# Patient Record
Sex: FEMALE | Race: WHITE | URBAN - METROPOLITAN AREA
[De-identification: names, ages, dates, MRNs, and addresses within clinical notes are randomized per-mention and may not be internally consistent; named-entity substitution may affect disease eponyms.]

---

## 2022-10-27 ENCOUNTER — OFFICE VISIT (OUTPATIENT)
Dept: ORTHOPEDIC SURGERY | Age: 57
End: 2022-10-27
Payer: OTHER GOVERNMENT

## 2022-10-27 VITALS — BODY MASS INDEX: 39.56 KG/M2 | HEIGHT: 62 IN | WEIGHT: 215 LBS | TEMPERATURE: 97.1 F

## 2022-10-27 DIAGNOSIS — M65.4 DE QUERVAIN'S TENOSYNOVITIS, RIGHT: ICD-10-CM

## 2022-10-27 DIAGNOSIS — M18.11 ARTHRITIS OF CARPOMETACARPAL (CMC) JOINT OF RIGHT THUMB: Primary | ICD-10-CM

## 2022-10-27 PROCEDURE — 99203 OFFICE O/P NEW LOW 30 MIN: CPT | Performed by: ORTHOPAEDIC SURGERY

## 2022-10-27 PROCEDURE — 20600 DRAIN/INJ JOINT/BURSA W/O US: CPT | Performed by: ORTHOPAEDIC SURGERY

## 2022-10-27 RX ORDER — EMPAGLIFLOZIN 10 MG/1
TABLET, FILM COATED ORAL
COMMUNITY
Start: 2022-08-24

## 2022-10-27 RX ORDER — LOSARTAN POTASSIUM AND HYDROCHLOROTHIAZIDE 25; 100 MG/1; MG/1
TABLET ORAL
COMMUNITY
Start: 2022-10-09

## 2022-10-27 RX ORDER — BUPROPION HYDROCHLORIDE 150 MG/1
150 TABLET, EXTENDED RELEASE ORAL ONCE
COMMUNITY

## 2022-10-27 RX ORDER — METFORMIN HYDROCHLORIDE 500 MG/1
TABLET, EXTENDED RELEASE ORAL
COMMUNITY
Start: 2022-08-12

## 2022-10-27 NOTE — PROGRESS NOTES
John Brian is a 61 y.o. female  . Worker's Compensation and legal considerations: none filed. Vitals:    10/27/22 1305   Temp: 97.1 °F (36.2 °C)   TempSrc: Temporal   Weight: 215 lb (97.5 kg)   Height: 5' 2\" (1.575 m)   PainSc:   4   PainLoc: Wrist           Chief Complaint   Patient presents with    Wrist Pain     Right wrist pain         HPI: Patient presents today with complaints of right wrist pain. She localizes it to the base of the thumb with some radiation up the arm. Date of onset: June 2022    Injury: No    Prior Treatment:  No    Numbness/ Tingling: No      ROS: Review of Systems - General ROS: negative  Psychological ROS: negative  ENT ROS: negative  Allergy and Immunology ROS: negative  Hematological and Lymphatic ROS: negative  Respiratory ROS: no cough, shortness of breath, or wheezing  Cardiovascular ROS: no chest pain or dyspnea on exertion  Gastrointestinal ROS: no abdominal pain, change in bowel habits, or black or bloody stools  Musculoskeletal ROS: negative  Neurological ROS: negative  Dermatological ROS: negative    No past medical history on file. No past surgical history on file. Current Outpatient Medications   Medication Sig Dispense Refill    metFORMIN ER (GLUCOPHAGE XR) 500 mg tablet       losartan-hydroCHLOROthiazide (HYZAAR) 100-25 mg per tablet       Jardiance 10 mg tablet       buPROPion SR (WELLBUTRIN SR) 150 mg SR tablet Take 150 mg by mouth once. Allergies   Allergen Reactions    Augmentin [Amoxicillin-Pot Clavulanate] Unknown (comments)    Bactrim [Sulfamethoprim] Unknown (comments)    Erythromycin Unknown (comments)           PE:     Physical Exam  Vitals and nursing note reviewed. Constitutional:       General: She is not in acute distress. Appearance: Normal appearance. She is not ill-appearing. Cardiovascular:      Pulses: Normal pulses. Pulmonary:      Effort: Pulmonary effort is normal. No respiratory distress.    Musculoskeletal: General: Swelling and tenderness present. No deformity or signs of injury. Normal range of motion. Cervical back: Normal range of motion and neck supple. Right lower leg: No edema. Left lower leg: No edema. Skin:     General: Skin is warm and dry. Capillary Refill: Capillary refill takes less than 2 seconds. Findings: No bruising or erythema. Neurological:      General: No focal deficit present. Mental Status: She is alert and oriented to person, place, and time. Psychiatric:         Mood and Affect: Mood normal.         Behavior: Behavior normal.          Hand:    Examination L Digit(s) R Digit(s)   1st CMC Tenderness -  +    1st CMC Grind -  +    Brandon Nodes -  -    Heberden Nodes -  -    A1 Pulley Tenderness -  -    Triggering -  -    UCL Instability -  -    RCL Instability -  -    Lateral Stress Pain -  -    Palmar Cords -  -    Tabletop test -  -    Garrod's Pads -  -     Strength       Pinch Strength         ROM: Full      Wrist:    Tenderness L R Test L R   1st Ext Comp - +- Finkelstein's - +-   Snuff Box - - Godinez - -   2nd Ext Comp - - S-L Shear - -   S-L Joint - - L-T Shear - -   L-T Joint - - DRUJ Sup - -   6th Ext Comp - - DRUJ Pro - -   Ulnar Snuff - - DRUJ Grind - -   Fovea - - TFCC - -   STT Joint - - Mid-Carp Inst - -   FCR - - P-T Grind - -   Intersection - - ECU Sublux. - -      Dorsal Ganglion: -   Volar Ganglion: -      ROM: Full        Imaging:     Deferred today        ICD-10-CM ICD-9-CM    1. Arthritis of carpometacarpal (CMC) joint of right thumb  M18.11 716.94 AMB SUPPLY ORDER      triamcinolone acetonide (KENALOG) 10 mg/mL injection 5 mg      MA DRAIN/INJECT SMALL JOINT/BURSA      2. De Quervain's tenosynovitis, right  M65.4 727.04             Plan:     Right thumb CMC joint injection and CMC brace.     There may be some component of de Quervain's tenosynovitis however it appears that most of the pain is coming from her [facility] joint.    Follow-up and Dispositions    Return in about 2 months (around 12/27/2022) for Reevaluation and x-rays on arrival of right wrist.          Plan was reviewed with patient, who verbalized agreement and understanding of the plan    [facility] NOTE        Chart reviewed for the following:   Oswaldo DAVIS DO, have reviewed the History, Physical and updated the Allergic reactions for [address] performed immediately prior to start of procedure:   Oswaldo DAVIS DO, have performed the following reviews on Ciara Urias prior to the start of the procedure:            * Patient was identified by name and date of birth   * Agreement on procedure being performed was verified  * Risks and Benefits explained to the patient  * Procedure site verified and marked as necessary  * Patient was positioned for comfort  * Consent was signed and verified     Time: 13:15      Date of procedure: 10/27/2022    Procedure performed by: Elayne Lacy DO    Provider assisted by: Aung Aceves MA    Patient assisted by: self    How tolerated by patient: tolerated the procedure well with no complications    Post Procedural Pain Scale: 0 - No Hurt    Comments: none    Procedure:  After consent was obtained, using sterile technique the right thumb was prepped. Local anesthetic used: 1% lidocaine. Kenalog 5 mg and was then injected and the needle withdrawn. The procedure was well tolerated. The patient is asked to continue to rest the area for a few more days before resuming regular activities. It may be more painful for the first 1-2 days. Watch for fever, or increased swelling or persistent pain in the joint. Call or return to clinic prn if such symptoms occur or there is failure to improve as anticipated.

## 2022-10-27 NOTE — LETTER
10/27/2022    Patient: Marino Blank   YOB: 1965   Date of Visit: 10/27/2022     Fuentes Shearer MD  [place] [address]  [address] [facility]  Via Fax: [phone]    Dear Fuentes Shearer MD,      Thank you for referring Ms. Marino Blank to [address] [place] [address] for evaluation. My notes for this consultation are attached. If you have questions, please do not hesitate to call me. I look forward to following your patient along with you.       Sincerely,    Gargisel Mercer, DO

## 2023-04-26 ENCOUNTER — OFFICE VISIT (OUTPATIENT)
Age: 58
End: 2023-04-26

## 2023-04-26 VITALS — BODY MASS INDEX: 38.64 KG/M2 | HEIGHT: 62 IN | WEIGHT: 210 LBS

## 2023-04-26 DIAGNOSIS — M18.11 ARTHRITIS OF CARPOMETACARPAL (CMC) JOINT OF RIGHT THUMB: Primary | ICD-10-CM

## 2023-04-26 RX ORDER — CETIRIZINE HYDROCHLORIDE 10 MG/1
10 CAPSULE, LIQUID FILLED ORAL
COMMUNITY
Start: 2015-02-11

## 2023-04-26 RX ORDER — TRAZODONE HYDROCHLORIDE 50 MG/1
TABLET ORAL
COMMUNITY
Start: 2023-02-19

## 2023-04-26 RX ORDER — CYCLOBENZAPRINE HCL 10 MG
TABLET ORAL
COMMUNITY
Start: 2023-01-17

## 2023-04-26 RX ORDER — LIDOCAINE 50 MG/G
PATCH TOPICAL
COMMUNITY
Start: 2023-01-17

## 2023-04-26 RX ORDER — HYDROCHLOROTHIAZIDE 12.5 MG/1
12.5 CAPSULE, GELATIN COATED ORAL DAILY
COMMUNITY

## 2023-04-26 RX ORDER — DICLOFENAC SODIUM 75 MG/1
TABLET, DELAYED RELEASE ORAL
COMMUNITY
Start: 2023-04-11

## 2023-04-26 RX ORDER — ATORVASTATIN CALCIUM 10 MG/1
TABLET, FILM COATED ORAL
COMMUNITY
Start: 2023-01-26

## 2023-04-26 RX ORDER — NIFEDIPINE 30 MG/1
TABLET, FILM COATED, EXTENDED RELEASE ORAL
COMMUNITY
Start: 2023-04-18

## 2023-04-26 RX ORDER — SEMAGLUTIDE 1.34 MG/ML
INJECTION, SOLUTION SUBCUTANEOUS
COMMUNITY
Start: 2023-03-22

## 2023-04-26 RX ORDER — OMEPRAZOLE 20 MG/1
20 CAPSULE, DELAYED RELEASE ORAL DAILY
COMMUNITY

## 2023-04-26 NOTE — PROGRESS NOTES
Orlin Oliva is a 60 y.o. female  . Worker's Compensation and legal considerations: none    Chief Complaint   Patient presents with    Hand Pain     Right     Pain Score:   8    HPI: Patient presents today for follow-up due to recurrence of right thumb base pain. Initial HPI: Patient presents today with complaints of right wrist pain. She localizes it to the base of the thumb with some radiation up the arm. Date of onset: June 2022  Injury: No  Prior Treatment:  Yes: Comment: Right thumb CMC joint injection and brace    ROS: Review of Systems - General ROS: negative except HPI    History reviewed. No pertinent past medical history. History reviewed. No pertinent surgical history.      Current Outpatient Medications   Medication Sig Dispense Refill    atorvastatin (LIPITOR) 10 MG tablet       Cetirizine HCl (ZYRTEC ALLERGY) 10 MG CAPS 10 mg      cyclobenzaprine (FLEXERIL) 10 MG tablet       diclofenac (VOLTAREN) 75 MG EC tablet       hydroCHLOROthiazide (MICROZIDE) 12.5 MG capsule Take 1 capsule by mouth daily      lidocaine (LIDODERM) 5 %       NIFEdipine (ADALAT CC) 30 MG extended release tablet       omeprazole (PRILOSEC) 20 MG delayed release capsule Take 1 capsule by mouth daily      OZEMPIC, 0.25 OR 0.5 MG/DOSE, 2 MG/1.5ML SOPN       traZODone (DESYREL) 50 MG tablet       buPROPion (WELLBUTRIN SR) 150 MG extended release tablet Take 1 tablet by mouth once      empagliflozin (JARDIANCE) 10 MG tablet ceived the following from [facility]: Outside name: Jardiance 10 mg tablet      losartan-hydroCHLOROthiazide (HYZAAR) 100-25 MG per tablet ceived the following from [facility]: Outside name: losartan-hydroCHLOROthiazide (HYZAAR) 100-25 mg per tablet      metFORMIN (GLUCOPHAGE-XR) 500 MG extended release tablet ceived the following from [facility]: Outside name: metFORMIN ER (GLUCOPHAGE XR) 500 mg tablet       Current Facility-Administered Medications

## 2023-11-21 ENCOUNTER — TELEPHONE (OUTPATIENT)
Age: 58
End: 2023-11-21

## 2023-11-21 NOTE — TELEPHONE ENCOUNTER
Called and spoke with pt. About her referral being  for  appt.  With provider, and she stated that she is aware and they are supposed to be faxing us the updated referral.

## 2023-11-27 ENCOUNTER — OFFICE VISIT (OUTPATIENT)
Age: 58
End: 2023-11-27
Payer: OTHER GOVERNMENT

## 2023-11-27 VITALS — WEIGHT: 206 LBS | HEIGHT: 62 IN | BODY MASS INDEX: 37.91 KG/M2

## 2023-11-27 DIAGNOSIS — M18.11 ARTHRITIS OF CARPOMETACARPAL (CMC) JOINT OF RIGHT THUMB: Primary | ICD-10-CM

## 2023-11-27 PROCEDURE — 20600 DRAIN/INJ JOINT/BURSA W/O US: CPT | Performed by: ORTHOPAEDIC SURGERY

## 2023-11-27 NOTE — PROGRESS NOTES
The patient is asked to continue to rest the area for a few more days before resuming regular activities. It may be more painful for the first 1-2 days. Watch for fever, or increased swelling or persistent pain in the joint. Call or return to clinic prn if such symptoms occur or there is failure to improve as anticipated. Note: This note was completed using voice recognition software.   Any typographical/name errors or mistakes are unintentional.

## 2023-12-05 ENCOUNTER — TELEPHONE (OUTPATIENT)
Age: 58
End: 2023-12-05

## 2023-12-05 NOTE — TELEPHONE ENCOUNTER
Spoke with patient  Patient stated:  Shot has helped in the past, but the pain did not subside after this last shot.  Patient states the pain feels like it is going to the thumb base and radiates to the distal aspect of the thumb. The Diclofenac didn't help either.    Patient states she doesn't want to wait as she normally does between shots, as this feels differently from before.

## 2023-12-05 NOTE — TELEPHONE ENCOUNTER
Patient is requesting a call back states that she have a few questions in regards to a new pain that is going on in the same area that she gets her injection in.      [phone]

## 2023-12-11 ENCOUNTER — OFFICE VISIT (OUTPATIENT)
Age: 58
End: 2023-12-11
Payer: OTHER GOVERNMENT

## 2023-12-11 VITALS — WEIGHT: 206 LBS | BODY MASS INDEX: 37.91 KG/M2 | HEIGHT: 62 IN

## 2023-12-11 DIAGNOSIS — M18.11 ARTHRITIS OF CARPOMETACARPAL (CMC) JOINT OF RIGHT THUMB: Primary | ICD-10-CM

## 2023-12-11 PROCEDURE — 99212 OFFICE O/P EST SF 10 MIN: CPT | Performed by: ORTHOPAEDIC SURGERY

## 2023-12-11 NOTE — PROGRESS NOTES
Bruce Aquino is a 60 y.o. female  . Worker's Compensation and legal considerations: none    Chief Complaint   Patient presents with    RT thumb     Pain Score:   5    HPI: Patient presents today due to incomplete relief of symptoms after her recent thumb CMC joint injection. 11/27/2023 HPI: Patient presents today for follow-up due to recurrence of right thumb base pain. Initial HPI: Patient presents today with complaints of right wrist pain. She localizes it to the base of the thumb with some radiation up the arm. Date of onset: June 2022  Injury: No  Prior Treatment:  Yes: Comment: Right thumb CMC joint injection and brace    ROS: Review of Systems - General ROS: negative except HPI    History reviewed. No pertinent past medical history.     Past Surgical History:   Procedure Laterality Date    US I&D BREAST ABSCESS DEEP  2/14/2019    US I&D BREAST ABSCESS DEEP        Current Outpatient Medications   Medication Sig Dispense Refill    atorvastatin (LIPITOR) 10 MG tablet       Cetirizine HCl (ZYRTEC ALLERGY) 10 MG CAPS 10 mg      cyclobenzaprine (FLEXERIL) 10 MG tablet       diclofenac (VOLTAREN) 75 MG EC tablet       hydroCHLOROthiazide (MICROZIDE) 12.5 MG capsule Take 1 capsule by mouth daily      lidocaine (LIDODERM) 5 %       NIFEdipine (ADALAT CC) 30 MG extended release tablet       omeprazole (PRILOSEC) 20 MG delayed release capsule Take 1 capsule by mouth daily      OZEMPIC, 0.25 OR 0.5 MG/DOSE, 2 MG/1.5ML SOPN       traZODone (DESYREL) 50 MG tablet       buPROPion (WELLBUTRIN SR) 150 MG extended release tablet Take 1 tablet by mouth once      empagliflozin (JARDIANCE) 10 MG tablet ceived the following from [facility]: Outside name: Jardiance 10 mg tablet      losartan-hydroCHLOROthiazide (HYZAAR) 100-25 MG per tablet ceived the following from [facility]: Outside name: losartan-hydroCHLOROthiazide (HYZAAR) 100-25 mg per tablet      metFORMIN (GLUCOPHAGE-XR) 500 MG

## 2024-03-28 ENCOUNTER — OFFICE VISIT (OUTPATIENT)
Age: 59
End: 2024-03-28

## 2024-03-28 VITALS — HEIGHT: 62 IN | BODY MASS INDEX: 37.73 KG/M2 | WEIGHT: 205 LBS

## 2024-03-28 DIAGNOSIS — M18.11 ARTHRITIS OF CARPOMETACARPAL (CMC) JOINT OF RIGHT THUMB: Primary | ICD-10-CM

## 2024-03-28 RX ORDER — LIDOCAINE HYDROCHLORIDE 10 MG/ML
0.5 INJECTION, SOLUTION INFILTRATION; PERINEURAL ONCE
Status: COMPLETED | OUTPATIENT
Start: 2024-03-28 | End: 2024-03-28

## 2024-03-28 RX ADMIN — LIDOCAINE HYDROCHLORIDE 0.5 ML: 10 INJECTION, SOLUTION INFILTRATION; PERINEURAL at 13:50

## 2024-08-08 ENCOUNTER — OFFICE VISIT (OUTPATIENT)
Age: 59
End: 2024-08-08

## 2024-08-08 VITALS — WEIGHT: 205 LBS | BODY MASS INDEX: 37.73 KG/M2 | HEIGHT: 62 IN

## 2024-08-08 DIAGNOSIS — M18.11 ARTHRITIS OF CARPOMETACARPAL (CMC) JOINT OF RIGHT THUMB: Primary | ICD-10-CM

## 2024-08-08 DIAGNOSIS — M25.561 CHRONIC PAIN OF RIGHT KNEE: ICD-10-CM

## 2024-08-08 DIAGNOSIS — G89.29 CHRONIC PAIN OF RIGHT KNEE: ICD-10-CM

## 2024-08-08 RX ORDER — LIDOCAINE HYDROCHLORIDE 10 MG/ML
0.5 INJECTION, SOLUTION INFILTRATION; PERINEURAL ONCE
Status: COMPLETED | OUTPATIENT
Start: 2024-08-08 | End: 2024-08-08

## 2024-08-08 RX ADMIN — LIDOCAINE HYDROCHLORIDE 0.5 ML: 10 INJECTION, SOLUTION INFILTRATION; PERINEURAL at 13:49

## 2024-08-08 NOTE — PROGRESS NOTES
Ilsa Castro is a 59 y.o. female  .  Worker's Compensation and legal considerations: none    Chief Complaint   Patient presents with    Hand Pain     Rt thumb     Pain Score:   7    8/8/2024 HPI: Patient returns today due to recurrence of right thumb base pain.  She is also complaining of right knee pain.    3/28/2024 HPI: Patient presents today for follow-up requesting a right thumb base injection.  Her initial injection resolved her symptoms significantly.  However her second injection did not do much for her symptoms.    Initial HPI: Patient presents today with complaints of right wrist pain.  She localizes it to the base of the thumb with some radiation up the arm.    Date of onset: June 2022  Injury: No  Prior Treatment:  Yes: Comment: Right thumb CMC joint injection and brace    ROS: Review of Systems - General ROS: negative except HPI    History reviewed. No pertinent past medical history.    Past Surgical History:   Procedure Laterality Date    US I&D BREAST ABSCESS DEEP  2/14/2019    US I&D BREAST ABSCESS DEEP        Current Outpatient Medications   Medication Sig Dispense Refill    atorvastatin (LIPITOR) 10 MG tablet       Cetirizine HCl (ZYRTEC ALLERGY) 10 MG CAPS 10 mg      cyclobenzaprine (FLEXERIL) 10 MG tablet       diclofenac (VOLTAREN) 75 MG EC tablet       hydroCHLOROthiazide (MICROZIDE) 12.5 MG capsule Take 1 capsule by mouth daily      lidocaine (LIDODERM) 5 %       NIFEdipine (ADALAT CC) 30 MG extended release tablet       omeprazole (PRILOSEC) 20 MG delayed release capsule Take 1 capsule by mouth daily      OZEMPIC, 0.25 OR 0.5 MG/DOSE, 2 MG/1.5ML SOPN       traZODone (DESYREL) 50 MG tablet       buPROPion (WELLBUTRIN SR) 150 MG extended release tablet Take 1 tablet by mouth once      empagliflozin (JARDIANCE) 10 MG tablet ceived the following from [facility]: Outside name: Jardiance 10 mg tablet      losartan-hydroCHLOROthiazide (HYZAAR) 100-25 MG per tablet ceived the

## 2024-12-05 ENCOUNTER — OFFICE VISIT (OUTPATIENT)
Age: 59
End: 2024-12-05

## 2024-12-05 VITALS — HEIGHT: 62 IN | WEIGHT: 207 LBS | BODY MASS INDEX: 38.09 KG/M2

## 2024-12-05 DIAGNOSIS — M18.11 ARTHRITIS OF CARPOMETACARPAL (CMC) JOINT OF RIGHT THUMB: Primary | ICD-10-CM

## 2024-12-05 RX ORDER — LIDOCAINE HYDROCHLORIDE 10 MG/ML
0.5 INJECTION, SOLUTION INFILTRATION; PERINEURAL ONCE
Status: COMPLETED | OUTPATIENT
Start: 2024-12-05 | End: 2024-12-05

## 2024-12-05 RX ADMIN — LIDOCAINE HYDROCHLORIDE 0.5 ML: 10 INJECTION, SOLUTION INFILTRATION; PERINEURAL at 13:59

## 2024-12-05 NOTE — PROGRESS NOTES
Ilsa Castro is a 59 y.o. female  .  Worker's Compensation and legal considerations: none    Chief Complaint   Patient presents with    Follow-up     Right thumb base pain        Pain Score:   7    12/6/2024 HPI: Patient presents today for follow-up of continued pain in the right thumb base.  She is requesting an injection today.    8/8/2024 HPI: Patient returns today due to recurrence of right thumb base pain.  She is also complaining of right knee pain.    Initial HPI: Patient presents today with complaints of right wrist pain.  She localizes it to the base of the thumb with some radiation up the arm.    Date of onset: June 2022  Injury: No  Prior Treatment:  Yes: Comment: Right thumb CMC joint injection and brace    ROS: Review of Systems - General ROS: negative except HPI    History reviewed. No pertinent past medical history.    Past Surgical History:   Procedure Laterality Date    US I&D BREAST ABSCESS DEEP  2/14/2019    US I&D BREAST ABSCESS DEEP        Current Outpatient Medications   Medication Sig Dispense Refill    atorvastatin (LIPITOR) 10 MG tablet       Cetirizine HCl (ZYRTEC ALLERGY) 10 MG CAPS 10 mg      cyclobenzaprine (FLEXERIL) 10 MG tablet       diclofenac (VOLTAREN) 75 MG EC tablet       hydroCHLOROthiazide (MICROZIDE) 12.5 MG capsule Take 1 capsule by mouth daily      lidocaine (LIDODERM) 5 %       NIFEdipine (ADALAT CC) 30 MG extended release tablet       omeprazole (PRILOSEC) 20 MG delayed release capsule Take 1 capsule by mouth daily      OZEMPIC, 0.25 OR 0.5 MG/DOSE, 2 MG/1.5ML SOPN       traZODone (DESYREL) 50 MG tablet       buPROPion (WELLBUTRIN SR) 150 MG extended release tablet Take 1 tablet by mouth once      empagliflozin (JARDIANCE) 10 MG tablet ceived the following from [facility]: Outside name: Jardiance 10 mg tablet      losartan-hydroCHLOROthiazide (HYZAAR) 100-25 MG per tablet ceived the following from [facility]: Outside name:

## 2025-03-13 ENCOUNTER — OFFICE VISIT (OUTPATIENT)
Age: 60
End: 2025-03-13

## 2025-03-13 VITALS
RESPIRATION RATE: 18 BRPM | BODY MASS INDEX: 38.57 KG/M2 | TEMPERATURE: 97.2 F | HEART RATE: 75 BPM | HEIGHT: 62 IN | WEIGHT: 209.6 LBS

## 2025-03-13 DIAGNOSIS — M18.11 ARTHRITIS OF CARPOMETACARPAL (CMC) JOINT OF RIGHT THUMB: Primary | ICD-10-CM

## 2025-03-13 RX ORDER — LIDOCAINE HYDROCHLORIDE 10 MG/ML
0.5 INJECTION, SOLUTION INFILTRATION; PERINEURAL ONCE
Status: COMPLETED | OUTPATIENT
Start: 2025-03-13 | End: 2025-03-13

## 2025-03-13 RX ORDER — MELOXICAM 15 MG/1
15 TABLET ORAL DAILY
Qty: 30 TABLET | Refills: 0 | Status: SHIPPED | OUTPATIENT
Start: 2025-03-13

## 2025-03-13 RX ADMIN — LIDOCAINE HYDROCHLORIDE 0.5 ML: 10 INJECTION, SOLUTION INFILTRATION; PERINEURAL at 14:37

## 2025-03-13 NOTE — PROGRESS NOTES
Ilsa Castro is a 60 y.o. female BNB .  Worker's Compensation and legal considerations: none    Chief Complaint   Patient presents with    Hand Pain     Pain Score:   6 (6-9/10 pain level)    3/13/2025 HPI: Patient presents today due to recurrence of right thumb base pain. She reports previous injection only lasted a few weeks.     12/6/2024 HPI: Patient presents today for follow-up of continued pain in the right thumb base.  She is requesting an injection today.    8/8/2024 HPI: Patient returns today due to recurrence of right thumb base pain.  She is also complaining of right knee pain.    Initial HPI: Patient presents today with complaints of right wrist pain.  She localizes it to the base of the thumb with some radiation up the arm.    Date of onset: June 2022  Injury: No  Prior Treatment:  Yes: Comment: Right thumb CMC joint injection and brace    ROS: Review of Systems - General ROS: negative except HPI    History reviewed. No pertinent past medical history.    Past Surgical History:   Procedure Laterality Date    US I&D BREAST ABSCESS DEEP  2/14/2019    US I&D BREAST ABSCESS DEEP        Current Outpatient Medications   Medication Sig Dispense Refill    meloxicam (MOBIC) 15 MG tablet Take 1 tablet by mouth daily 30 tablet 0    Cetirizine HCl (ZYRTEC ALLERGY) 10 MG CAPS 10 mg      lidocaine (LIDODERM) 5 %       NIFEdipine (ADALAT CC) 30 MG extended release tablet       omeprazole (PRILOSEC) 20 MG delayed release capsule Take 1 capsule by mouth daily      OZEMPIC, 0.25 OR 0.5 MG/DOSE, 2 MG/1.5ML SOPN       buPROPion (WELLBUTRIN SR) 150 MG extended release tablet Take 1 tablet by mouth once      losartan-hydroCHLOROthiazide (HYZAAR) 100-25 MG per tablet ceived the following from [facility]: Outside name: losartan-hydroCHLOROthiazide (HYZAAR) 100-25 mg per tablet      metFORMIN (GLUCOPHAGE-XR) 500 MG extended release tablet ceived the following from [facility]:

## 2025-03-21 DIAGNOSIS — M18.11 ARTHRITIS OF CARPOMETACARPAL (CMC) JOINT OF RIGHT THUMB: ICD-10-CM

## 2025-03-21 RX ORDER — MELOXICAM 15 MG/1
15 TABLET ORAL DAILY
Qty: 90 TABLET | Refills: 3 | Status: SHIPPED | OUTPATIENT
Start: 2025-03-21 | End: 2026-03-16

## 2025-04-17 DIAGNOSIS — M18.11 ARTHRITIS OF CARPOMETACARPAL (CMC) JOINT OF RIGHT THUMB: Primary | ICD-10-CM

## 2025-07-03 ENCOUNTER — OFFICE VISIT (OUTPATIENT)
Age: 60
End: 2025-07-03
Payer: OTHER GOVERNMENT

## 2025-07-03 VITALS — BODY MASS INDEX: 38.46 KG/M2 | HEIGHT: 62 IN | WEIGHT: 209 LBS

## 2025-07-03 DIAGNOSIS — M18.11 ARTHRITIS OF CARPOMETACARPAL (CMC) JOINT OF RIGHT THUMB: Primary | ICD-10-CM

## 2025-07-03 PROCEDURE — 20600 DRAIN/INJ JOINT/BURSA W/O US: CPT

## 2025-07-03 PROCEDURE — 99212 OFFICE O/P EST SF 10 MIN: CPT

## 2025-07-03 RX ORDER — LIDOCAINE HYDROCHLORIDE 10 MG/ML
0.5 INJECTION, SOLUTION INFILTRATION; PERINEURAL ONCE
Status: COMPLETED | OUTPATIENT
Start: 2025-07-03 | End: 2025-07-03

## 2025-07-03 RX ADMIN — LIDOCAINE HYDROCHLORIDE 0.5 ML: 10 INJECTION, SOLUTION INFILTRATION; PERINEURAL at 16:26

## 2025-07-03 NOTE — PROGRESS NOTES
her.    Return in about 3 months (around 10/3/2025) for Follow up, Rt thumb.     Plan was reviewed with patient, who verbalized agreement and understanding of the plan    [facility]  [address]  [place] [address]   OFFICE PROCEDURE PROGRESS NOTE        Chart reviewed for the following:   Gabriela ALFREDO PA-C, have reviewed the History, Physical and updated the Allergic reactions for Ilsa Castro     TIME OUT performed immediately prior to start of procedure:   Gabriela ALFREDO PA-C, have performed the following reviews on Ilsa Castro prior to the start of the procedure:            * Patient was identified by name and date of birth   * Agreement on procedure being performed was verified  * Risks and Benefits explained to the patient  * Procedure site verified and marked as necessary  * Patient was positioned for comfort  * Consent was signed and verified      Procedure performed by:  Gabriela Hargrove PA-C    Patient assisted by: self    How tolerated by patient: tolerated    Post Procedural Pain Scale:0    Comments: none    Procedure:  After consent was obtained, using sterile technique the right thumb was prepped. Local anesthetic used: 1% Lidocaine Kenalog 5 mg and was then injected and the needle withdrawn.  The procedure was well tolerated.  The patient is asked to continue to rest the area for a few more days before resuming regular activities.  It may be more painful for the first 1-2 days.  Watch for fever, or increased swelling or persistent pain in the joint. Call or return to clinic prn if such symptoms occur or there is failure to improve as anticipated.     Note: This note was completed using voice recognition software.  Any typographical/name errors or mistakes are unintentional.